# Patient Record
Sex: MALE | Race: BLACK OR AFRICAN AMERICAN | NOT HISPANIC OR LATINO | URBAN - METROPOLITAN AREA
[De-identification: names, ages, dates, MRNs, and addresses within clinical notes are randomized per-mention and may not be internally consistent; named-entity substitution may affect disease eponyms.]

---

## 2019-07-27 ENCOUNTER — EMERGENCY (EMERGENCY)
Facility: HOSPITAL | Age: 44
LOS: 0 days | Discharge: HOME | End: 2019-07-27
Attending: EMERGENCY MEDICINE
Payer: SELF-PAY

## 2019-07-27 VITALS
RESPIRATION RATE: 16 BRPM | DIASTOLIC BLOOD PRESSURE: 97 MMHG | SYSTOLIC BLOOD PRESSURE: 170 MMHG | HEART RATE: 79 BPM | WEIGHT: 270.07 LBS | HEIGHT: 75 IN | OXYGEN SATURATION: 98 % | TEMPERATURE: 98 F

## 2019-07-27 VITALS
SYSTOLIC BLOOD PRESSURE: 157 MMHG | RESPIRATION RATE: 18 BRPM | TEMPERATURE: 98 F | OXYGEN SATURATION: 96 % | DIASTOLIC BLOOD PRESSURE: 99 MMHG
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
## 2019-07-27 DIAGNOSIS — M54.9 DORSALGIA, UNSPECIFIED: ICD-10-CM

## 2019-07-27 PROCEDURE — 71250 CT THORAX DX C-: CPT | Mod: 26

## 2019-07-27 PROCEDURE — 99284 EMERGENCY DEPT VISIT MOD MDM: CPT

## 2019-07-27 PROCEDURE — 74176 CT ABD & PELVIS W/O CONTRAST: CPT | Mod: 26

## 2019-07-27 RX ORDER — KETOROLAC TROMETHAMINE 30 MG/ML
60 SYRINGE (ML) INJECTION ONCE
Refills: 0 | Status: DISCONTINUED | OUTPATIENT
Start: 2019-07-27 | End: 2019-07-27

## 2019-07-27 RX ORDER — CYCLOBENZAPRINE HYDROCHLORIDE 10 MG/1
1 TABLET, FILM COATED ORAL
Qty: 21 | Refills: 0
Start: 2019-07-27 | End: 2019-08-02

## 2019-07-27 RX ORDER — IBUPROFEN 200 MG
1 TABLET ORAL
Qty: 30 | Refills: 0
Start: 2019-07-27 | End: 2019-08-05

## 2019-07-27 RX ADMIN — Medication 60 MILLIGRAM(S): at 14:35

## 2019-07-27 NOTE — ED PROVIDER NOTE - OBJECTIVE STATEMENT
44 year old male that has knee issues and needs knee replacements came to the ED after he slipped on the steps and fell down approximately 10 steps. he slid down the steps and injured his right upper back. No LOC and he did not hit his head, no neck pain, no abd pain, no chest pain, no sob, no vomiting, no weakness, no paresthesia.

## 2019-07-27 NOTE — ED PROVIDER NOTE - CLINICAL SUMMARY MEDICAL DECISION MAKING FREE TEXT BOX
Pt with mechanical fall with back and right hip pain, imaging noted and pt improved with pain meds and is ambulatory. Will dc to follow up with pmd/ortho. Precautions reviewed.

## 2019-07-27 NOTE — ED PROVIDER NOTE - CARE PROVIDER_API CALL
Gilmer George)  Orthopaedic Surgery  [address], [address]  [place] [address]  Phone: [phone]  Fax: [phone]  Follow Up Time:

## 2019-07-27 NOTE — ED ADULT NURSE NOTE - OBJECTIVE STATEMENT
pt s/p mechanical fall approx. 10 steps, now p/w upper back pain and lower back pain, denies head trauma or LOC, denies anticoagulation use, pt ambulatory at bedside

## 2019-07-27 NOTE — ED ADULT TRIAGE NOTE - CHIEF COMPLAINT QUOTE
pt states he fell down from the stairs now having trouble breathing and unable to sit. pt also is having pain in bilateral knee.

## 2019-07-27 NOTE — ED PROVIDER NOTE - MUSCULOSKELETAL, MLM
Spine appears normal, range of motion is not limited, there is pain over the right paraspinal thoracic region, no midline c/t/l spine tenderness.

## 2020-05-14 ENCOUNTER — EMERGENCY (EMERGENCY)
Facility: HOSPITAL | Age: 45
LOS: 0 days | Discharge: HOME | End: 2020-05-14
Attending: EMERGENCY MEDICINE
Payer: SELF-PAY

## 2020-05-14 VITALS
TEMPERATURE: 98 F | RESPIRATION RATE: 19 BRPM | OXYGEN SATURATION: 98 % | SYSTOLIC BLOOD PRESSURE: 162 MMHG | DIASTOLIC BLOOD PRESSURE: 118 MMHG | HEIGHT: 74 IN | HEART RATE: 90 BPM | WEIGHT: 289.91 LBS

## 2020-05-14 VITALS
SYSTOLIC BLOOD PRESSURE: 192 MMHG | HEART RATE: 101 BPM | RESPIRATION RATE: 19 BRPM | TEMPERATURE: 98 F | DIASTOLIC BLOOD PRESSURE: 121 MMHG | OXYGEN SATURATION: 100 %

## 2020-05-14 PROBLEM — Z78.9 OTHER SPECIFIED HEALTH STATUS: Chronic | Status: ACTIVE | Noted: 2019-07-27

## 2020-05-14 LAB
ALBUMIN SERPL ELPH-MCNC: 3.4 G/DL — SIGNIFICANT CHANGE UP (ref 3.3–5)
ALP SERPL-CCNC: 58 U/L — SIGNIFICANT CHANGE UP (ref 40–120)
ALT FLD-CCNC: 24 U/L — SIGNIFICANT CHANGE UP (ref 12–78)
ANION GAP SERPL CALC-SCNC: 4 MMOL/L — LOW (ref 5–17)
APTT BLD: 34.5 SEC — SIGNIFICANT CHANGE UP (ref 27.5–36.3)
AST SERPL-CCNC: 15 U/L — SIGNIFICANT CHANGE UP (ref 15–37)
BASOPHILS # BLD AUTO: 0.05 K/UL — SIGNIFICANT CHANGE UP (ref 0–0.2)
BASOPHILS NFR BLD AUTO: 0.4 % — SIGNIFICANT CHANGE UP (ref 0–2)
BILIRUB SERPL-MCNC: 0.4 MG/DL — SIGNIFICANT CHANGE UP (ref 0.2–1.2)
BUN SERPL-MCNC: 20 MG/DL — SIGNIFICANT CHANGE UP (ref 7–23)
CALCIUM SERPL-MCNC: 8.5 MG/DL — SIGNIFICANT CHANGE UP (ref 8.5–10.1)
CHLORIDE SERPL-SCNC: 107 MMOL/L — SIGNIFICANT CHANGE UP (ref 96–108)
CK SERPL-CCNC: 185 U/L — SIGNIFICANT CHANGE UP (ref 26–308)
CO2 SERPL-SCNC: 27 MMOL/L — SIGNIFICANT CHANGE UP (ref 22–31)
CREAT SERPL-MCNC: 1.09 MG/DL — SIGNIFICANT CHANGE UP (ref 0.5–1.3)
EOSINOPHIL # BLD AUTO: 0.83 K/UL — HIGH (ref 0–0.5)
EOSINOPHIL NFR BLD AUTO: 7.1 % — HIGH (ref 0–6)
GLUCOSE SERPL-MCNC: 96 MG/DL — SIGNIFICANT CHANGE UP (ref 70–99)
HCT VFR BLD CALC: 37.8 % — LOW (ref 39–50)
HGB BLD-MCNC: 11.4 G/DL — LOW (ref 13–17)
IMM GRANULOCYTES NFR BLD AUTO: 0.8 % — SIGNIFICANT CHANGE UP (ref 0–1.5)
INR BLD: 1.1 RATIO — SIGNIFICANT CHANGE UP (ref 0.88–1.16)
LYMPHOCYTES # BLD AUTO: 1.68 K/UL — SIGNIFICANT CHANGE UP (ref 1–3.3)
LYMPHOCYTES # BLD AUTO: 14.3 % — SIGNIFICANT CHANGE UP (ref 13–44)
MANUAL SMEAR VERIFICATION: SIGNIFICANT CHANGE UP
MCHC RBC-ENTMCNC: 21.7 PG — LOW (ref 27–34)
MCHC RBC-ENTMCNC: 30.2 GM/DL — LOW (ref 32–36)
MCV RBC AUTO: 71.9 FL — LOW (ref 80–100)
MICROCYTES BLD QL: SLIGHT — SIGNIFICANT CHANGE UP
MONOCYTES # BLD AUTO: 1.22 K/UL — HIGH (ref 0–0.9)
MONOCYTES NFR BLD AUTO: 10.4 % — SIGNIFICANT CHANGE UP (ref 2–14)
NEUTROPHILS # BLD AUTO: 7.89 K/UL — HIGH (ref 1.8–7.4)
NEUTROPHILS NFR BLD AUTO: 67 % — SIGNIFICANT CHANGE UP (ref 43–77)
NRBC # BLD: 0 /100 WBCS — SIGNIFICANT CHANGE UP (ref 0–0)
PLAT MORPH BLD: NORMAL — SIGNIFICANT CHANGE UP
PLATELET # BLD AUTO: 404 K/UL — HIGH (ref 150–400)
POTASSIUM SERPL-MCNC: 3.7 MMOL/L — SIGNIFICANT CHANGE UP (ref 3.5–5.3)
POTASSIUM SERPL-SCNC: 3.7 MMOL/L — SIGNIFICANT CHANGE UP (ref 3.5–5.3)
PROT SERPL-MCNC: 7.3 GM/DL — SIGNIFICANT CHANGE UP (ref 6–8.3)
PROTHROM AB SERPL-ACNC: 12.3 SEC — SIGNIFICANT CHANGE UP (ref 10–12.9)
RBC # BLD: 5.26 M/UL — SIGNIFICANT CHANGE UP (ref 4.2–5.8)
RBC # FLD: 16.2 % — HIGH (ref 10.3–14.5)
RBC BLD AUTO: SIGNIFICANT CHANGE UP
SODIUM SERPL-SCNC: 138 MMOL/L — SIGNIFICANT CHANGE UP (ref 135–145)
WBC # BLD: 11.76 K/UL — HIGH (ref 3.8–10.5)
WBC # FLD AUTO: 11.76 K/UL — HIGH (ref 3.8–10.5)

## 2020-05-14 PROCEDURE — 73700 CT LOWER EXTREMITY W/O DYE: CPT | Mod: 26,RT

## 2020-05-14 PROCEDURE — 73590 X-RAY EXAM OF LOWER LEG: CPT | Mod: 26,RT

## 2020-05-14 PROCEDURE — 73610 X-RAY EXAM OF ANKLE: CPT | Mod: 26,RT

## 2020-05-14 PROCEDURE — 93971 EXTREMITY STUDY: CPT | Mod: 26,RT

## 2020-05-14 PROCEDURE — 73562 X-RAY EXAM OF KNEE 3: CPT | Mod: 26,RT

## 2020-05-14 PROCEDURE — 73718 MRI LOWER EXTREMITY W/O DYE: CPT | Mod: 26,RT

## 2020-05-14 PROCEDURE — 99053 MED SERV 10PM-8AM 24 HR FAC: CPT

## 2020-05-14 PROCEDURE — 99284 EMERGENCY DEPT VISIT MOD MDM: CPT

## 2020-05-14 RX ORDER — MORPHINE SULFATE 50 MG/1
4 CAPSULE, EXTENDED RELEASE ORAL ONCE
Refills: 0 | Status: DISCONTINUED | OUTPATIENT
Start: 2020-05-14 | End: 2020-05-14

## 2020-05-14 RX ORDER — IBUPROFEN 200 MG
1 TABLET ORAL
Qty: 20 | Refills: 0
Start: 2020-05-14 | End: 2020-05-18

## 2020-05-14 RX ADMIN — MORPHINE SULFATE 4 MILLIGRAM(S): 50 CAPSULE, EXTENDED RELEASE ORAL at 11:08

## 2020-05-14 NOTE — ED PROVIDER NOTE - CARE PROVIDER_API CALL
Robin Groves  ORTHOPAEDIC SURGERY  [address] [place] [address]  Phone: [phone]  Fax: [phone]  Follow Up Time:

## 2020-05-14 NOTE — ED ADULT NURSE REASSESSMENT NOTE - NS ED NURSE REASSESS COMMENT FT1
Pt states his pain is not as bad as it was on arrival. Denies headache, blurred vision and dizziness. Informed to follow up with PMD in regards to BP. Pt states he is aggravated because he was placed next to a Covid positive pt, had only 1 meal for the day and had to wait for further testing. Pt informed to return to ER for worsening pain or symptoms

## 2020-05-14 NOTE — CONSULT NOTE ADULT - ASSESSMENT
A/P:  Patient is a 45y Male w/ large fluid collection deep to gastroc likely muscle strain w/ associated hematoma    -No plan for acute orthopaedic surgical intervention indicated at this time  -Multimodal Analgesia - recommend low dose opioids and acetaminophen as tolerated  -WBAT w/ assistive devices as needed  -PT/OT prn  -Ice and elevate as tolerated  -Orthopaedically stable or discharge  -Pt made aware of signs and symptoms of compartment syndrome. Patient made aware of need to return to ED if symptoms develop.  -Recommend follow up w/ Dr. Groves outpatient within 1-2 week. Call office to schedule appointment.  -All Patient's and Family Member's questions answered. Patient/family understand and agree w/ plan.  -Will discuss w/ attending and advise if plan changes.    Kyle Verde M.D.   Orthopaedic Surgery A/P:  Patient is a 45y Male w/ large fluid collection deep to gastroc likely muscle strain w/ associated hematoma, underlying hemorrhagic mass cannot be excluded. Recommend followup to ensure resolution of hematoma.    -No plan for acute orthopaedic surgical intervention indicated at this time  -Multimodal Analgesia - recommend low dose opioids and acetaminophen as tolerated  -WBAT w/ assistive devices as needed  -PT/OT prn  -Ice and elevate as tolerated  -Orthopaedically stable or discharge  -Pt made aware of signs and symptoms of compartment syndrome. Patient made aware of need to return to ED if symptoms develop.  -Recommend follow up w/ Dr. Groves outpatient within 1-2 week. Call office to schedule appointment.  -All Patient's and Family Member's questions answered. Patient/family understand and agree w/ plan.  -Will discuss w/ attending and advise if plan changes.    Kyle Verde M.D.   Orthopaedic Surgery A/P:  Patient is a 45y Male w/ large fluid collection deep to gastroc likely muscle strain w/ associated large hematoma deep to gastroc, possibly posttraumatic as patient cannot deny trauma with certainty. However, underlying hemorrhagic mass or undiagnosed blood dyscrasia cannot be excluded. Recommend followup to ensure resolution of hematoma.    -No plan for acute orthopaedic surgical intervention indicated at this time  -Multimodal Analgesia - recommend low dose opioids and acetaminophen as tolerated  -WBAT w/ assistive devices as needed  -Compressive ace bandage  -Ice and elevate as tolerated  -Orthopaedically stable or discharge  -Pt made aware of signs and symptoms of compartment syndrome. Patient made aware of need to return to ED if symptoms develop.  -Recommend follow up w/ Dr. Groves outpatient within 1-2 week. Call office to schedule appointment.  -All Patient's and Family Member's questions answered. Patient/family understand and agree w/ plan.  -Will discuss w/ attending and advise if plan changes.    Kyle Verde M.D.   Orthopaedic Surgery A/P:  Patient is a 45y Male w/ large fluid collection deep to gastroc likely muscle strain w/ associated large hematoma deep to gastroc, possibly posttraumatic as patient cannot deny trauma with certainty. However, underlying hemorrhagic mass or undiagnosed blood dyscrasia cannot be excluded. Recommend followup to ensure resolution of hematoma.    -No plan for acute orthopaedic surgical intervention indicated at this time  -Multimodal Analgesia - recommend low dose opioids and acetaminophen as tolerated  -WBAT w/ assistive devices as needed  -Compressive ace bandage  -Ice and elevate as tolerated  -Orthopaedically stable or discharge  -Pt made aware of signs and symptoms of compartment syndrome. Patient made aware of need to return to ED if symptoms develop.  -I had an extensive conversation w/ the patient regarding the possibility of an underlying hemorrhagic mass & the importance of close followup. Furthermore, patient made aware of the possibility of requiring further oncological workup should the hematoma not resolve. Patient & wife on the phone both voiced understanding of the need for close followup and agreed.  -Recommend follow up w/ Dr. Groves outpatient in 1 week. Call office to schedule appointment.  -All Patient's and Family Member's questions answered. Patient/family understand and agree w/ plan.  -Will discuss w/ attending and advise if plan changes.    Kyle Verde M.D.   Orthopaedic Surgery

## 2020-05-14 NOTE — ED PROVIDER NOTE - CARE PLAN
Principal Discharge DX:	Hematoma of right lower extremity, initial encounter Principal Discharge DX:	Hematoma of right lower extremity, initial encounter  Secondary Diagnosis:	Elevated blood pressure reading without diagnosis of hypertension

## 2020-05-14 NOTE — ED ADULT NURSE NOTE - OBJECTIVE STATEMENT
PT c/o of right calf pain and swelling x 1 week. denies any Chest pain, difficulty breathing but reported intermittent claudication. Pt unable put weight on the right extremity. denies any cough or fever.

## 2020-05-14 NOTE — ED ADULT TRIAGE NOTE - CHIEF COMPLAINT QUOTE
patient BIBA c/o of R lower extremities pain and swelling started 1 week ago , denied chest pain denied difficulty breathing , c/o of headache

## 2020-05-14 NOTE — CONSULT NOTE ADULT - SUBJECTIVE AND OBJECTIVE BOX
Patient is a 45yMale community ambulator without assistive devices who presents to [facility] ED w/ a c/o of atraumatic progressive right calf pain. Patient states he woke up w/ a cramp 10 days ago which eventually went away but pain persisted and progressively worsened. Denies any recent trauma. Denies HS/LOC. Denies any recent increase in activities/exercise Localizing pain to right calf, denies radiation of pain elsewhere. States ability to ambulate now w/ a cane/crutches. Denies any numbness or tingling. Denies having any other pain elsewhere. Reports has significant arthritis of knees & "needs knee surgery at some point". No other orthopaedic concerns at this time.    PAST MEDICAL & SURGICAL HISTORY:  No pertinent past medical history  No significant past surgical history    Allergy Status Unknown    PHYSICAL EXAM:  T(C): 37.1 (05-14-20 @ 15:39), Max: 37.1 (05-14-20 @ 15:39)  HR: 89 (05-14-20 @ 15:39) (89 - 90)  BP: 184/111 (05-14-20 @ 15:39) (162/118 - 184/111)  RR: 20 (05-14-20 @ 15:39) (19 - 20)  SpO2: 100% (05-14-20 @ 15:39) (98% - 100%)    Gen: NAD, Resting comfortably  RLE:  Skin intact, no erythema or ecchymosis, + significant swelling about the calf compared to contralateral LE  + knee effusion  No pain to passive stretch, minimal pain to 1st toe flexion against resistance  No bony tenderness to palpation  +EHL/FHL/TA/GSC  +SILT L2-S1  + DP  Compartments soft and compressible  No calf tenderness    Secondary Survey:   No TTP over bony prominences, SILT, palpable pulses, full/painless A/PROM, compartments soft. No TTP over spinous processes or paraspinal muscles at C/T/L spine. No palpable step off. No other injuries or complaints.    Imaging:  CT/MRI RLE demonstrate large 18x8x4 cm fluid collection deep to gastroc likely hematoma, +knee effusion Patient is a 45yMale community ambulator without assistive devices who presents to [facility] ED w/ a c/o of atraumatic progressive right calf pain. Patient states he woke up w/ a cramp 10 days ago which eventually went away but pain persisted and progressively worsened. Denies any recent trauma. Denies HS/LOC. Denies any recent increase in activities/exercise Localizing pain to right calf, denies radiation of pain elsewhere. States ability to ambulate now w/ a cane/crutches. Denies any numbness or tingling. Denies having any other pain elsewhere. Reports has significant arthritis of knees & "needs knee surgery at some point". No other orthopaedic concerns at this time. Upon further questioning, the patient reports alcohol use at home during the pandemic & cannot deny trauma w/ certainty.    PAST MEDICAL & SURGICAL HISTORY:  No pertinent past medical history  No significant past surgical history    Allergy Status Unknown    PHYSICAL EXAM:  T(C): 37.1 (05-14-20 @ 15:39), Max: 37.1 (05-14-20 @ 15:39)  HR: 89 (05-14-20 @ 15:39) (89 - 90)  BP: 184/111 (05-14-20 @ 15:39) (162/118 - 184/111)  RR: 20 (05-14-20 @ 15:39) (19 - 20)  SpO2: 100% (05-14-20 @ 15:39) (98% - 100%)    Gen: NAD, Resting comfortably  RLE:  Skin intact, no erythema or ecchymosis, + significant swelling about the calf compared to contralateral LE  + knee effusion  A/PROM of knee 0-100 without pain, >100 limited due to calf pain  No varus/valgus instability  Negative lachmann/posterior drawer  No pain to passive stretch, minimal pain to 1st toe flexion against resistance  No bony tenderness to palpation  +EHL/FHL/TA/GSC  +SILT L2-S1  + DP  Compartments soft and compressible  No calf tenderness    Secondary Survey:   No TTP over bony prominences, SILT, palpable pulses, full/painless A/PROM, compartments soft. No TTP over spinous processes or paraspinal muscles at C/T/L spine. No palpable step off. No other injuries or complaints.    Imaging:  CT/MRI RLE demonstrate large 18x8x4 cm fluid collection deep to gastroc likely hematoma, +knee effusion, distal synostosis & subchondral tib/talar cyst, degenerative changes of knee/ankle Patient is a 45yMale community ambulator without assistive devices who presents to [facility] ED w/ a c/o of atraumatic progressive right calf pain. Patient states he woke up w/ a cramp 10 days ago which eventually went away but pain persisted and progressively worsened. Denies any recent trauma. Denies HS/LOC. Denies any recent increase in activities/exercise Localizing pain to right calf, denies radiation of pain elsewhere. States ability to ambulate now w/ a cane/crutches. Denies any numbness or tingling. Denies having any other pain elsewhere. Reports has significant arthritis of knees & "needs knee surgery at some point". No other orthopaedic concerns at this time. Upon further questioning, the patient reports alcohol use at home during the pandemic & cannot deny trauma w/ certainty. Denies any previous surgeries but reports significant trauma in the past to BLLE from football/basketball.    PAST MEDICAL & SURGICAL HISTORY:  No pertinent past medical history  No significant past surgical history    Allergy Status Unknown    PHYSICAL EXAM:  T(C): 37.1 (05-14-20 @ 15:39), Max: 37.1 (05-14-20 @ 15:39)  HR: 89 (05-14-20 @ 15:39) (89 - 90)  BP: 184/111 (05-14-20 @ 15:39) (162/118 - 184/111)  RR: 20 (05-14-20 @ 15:39) (19 - 20)  SpO2: 100% (05-14-20 @ 15:39) (98% - 100%)    Gen: NAD, Resting comfortably  RLE:  Skin intact, no erythema or ecchymosis, + significant swelling about the calf compared to contralateral LE  + knee effusion  A/PROM of knee 0-100 without pain, >100 limited due to calf pain  No varus/valgus instability  Negative lachmann/posterior drawer  No pain to passive stretch, minimal pain to 1st toe flexion against resistance  No bony tenderness to palpation  +EHL/FHL/TA/GSC  +SILT L2-S1  + DP  Compartments soft and compressible  No calf tenderness    Secondary Survey:   No TTP over bony prominences, SILT, palpable pulses, full/painless A/PROM, compartments soft. No TTP over spinous processes or paraspinal muscles at C/T/L spine. No palpable step off. No other injuries or complaints.    Imaging:  CT/MRI RLE demonstrate large 18x8x4 cm fluid collection deep to gastroc likely hematoma, +knee effusion, distal synostosis & subchondral tib/talar cyst, degenerative changes of knee/ankle

## 2020-05-14 NOTE — ED PROVIDER NOTE - MUSCULOSKELETAL, MLM
Spine appears normal, range of motion is not limited, right posterior leg mild swelling and tenderness

## 2020-05-14 NOTE — ED PROVIDER NOTE - NSFOLLOWUPCLINICS_GEN_ALL_ED_FT
[facility] Orthopedic Surgery  Orthopedic Surgery  [address] 3rd & [address]th [place] [address]  Phone: [phone]  Fax:   Follow Up Time: [facility] Orthopedic Surgery  Orthopedic Surgery  [address], 3rd & 4th [place] [address]  Phone: [phone]  Fax:     [facility] Internal Medicine  General Internal Medicine  [address] [place] [id number]  Phone: [phone]  Fax:   Follow Up Time:

## 2020-05-14 NOTE — ED PROVIDER NOTE - CLINICAL SUMMARY MEDICAL DECISION MAKING FREE TEXT BOX
labs and diagnostic imaging results reviewed with patient; symptoms improved; ortho consulted; analgesics, PMD or clinic follow up recommended for reassessment. Patient is aware of signs/symptoms to return to the emergency department. labs and diagnostic imaging results reviewed with patient; symptoms improved; ortho consulted; analgesics; patient made aware of elevated blood pressure readings; PMD or clinic follow up recommended for reassessment. Patient is aware of signs/symptoms to return to the emergency department.

## 2020-05-14 NOTE — ED PROVIDER NOTE - PATIENT PORTAL LINK FT
You can access the FollowMyHealth Patient Portal offered by [facility] by registering at the following website: [email or web address]. By joining [employer]’s FollowMyHealth portal, you will also be able to view your health information using other applications (apps) compatible with our system. You can access the FollowMyHealth Patient Portal offered by [facility] by registering at the following website: [email or web address]. By joining [employer]’s FollowMyHealth portal, you will also be able to view your health information using other applications (apps) compatible with our system.
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
## 2020-05-15 DIAGNOSIS — R03.0 ELEVATED BLOOD-PRESSURE READING, WITHOUT DIAGNOSIS OF HYPERTENSION: ICD-10-CM

## 2020-05-15 DIAGNOSIS — S80.11XA CONTUSION OF RIGHT LOWER LEG, INITIAL ENCOUNTER: ICD-10-CM

## 2020-05-15 DIAGNOSIS — M79.661 PAIN IN RIGHT LOWER LEG: ICD-10-CM
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
## 2020-12-18 NOTE — ED ADULT TRIAGE NOTE - MEANS OF ARRIVAL
Pt here for CBC review. He c/o not feeling well and having a rough week during which he had 1 episode of explosive diarrhea and 1 episode of vomiting. The pt explained that he has had a problem fully swallowing his pills; they get lodged in his throat The pt was instructed to try swallowing his pills with food such as applesauce. The pt said that swallowing his pills with Ensure has actually helped.    CBC returned with hgb of 7.1. Per SCOTTY Hagan, the pt should receive 2 units of blood. The pt was informed and v/u  He requested that the blood transfusion be scheduled for Sunday, December 20th; the pt was scheduled at 8:15am. The pt was also informed that his wbc count and platelets are low but stable. He was reminded to follow neutropenic precautions. The pt was reminded to return as scheduled on Wed. December 23rd for a lab recheck with RN review. Both the pt and daughter v/u.     stretcher

## 2021-04-08 NOTE — ED PROVIDER NOTE - CPE EDP MUSC NORM
Watch and wait with the antibiotic prescription. If the redness and swelling subsides with elevation it is likely not infected.   
normal...

## 2023-06-16 NOTE — ED PROVIDER NOTE - CONSTITUTIONAL, MLM
normal... Well appearing, awake, alert, oriented to person, place, time/situation and in no apparent distress. father/mother

## 2024-02-16 NOTE — ED ADULT NURSE NOTE - PAIN: BODY LOCATION
Uncircumcised with no ronquillo in place. Chaperone: Li Graf) Where Is Your Acne Located?: Cheeks and chest Right buttock right leg, right ankle right knee